# Patient Record
Sex: FEMALE | Race: WHITE | Employment: OTHER | URBAN - METROPOLITAN AREA
[De-identification: names, ages, dates, MRNs, and addresses within clinical notes are randomized per-mention and may not be internally consistent; named-entity substitution may affect disease eponyms.]

---

## 2017-01-24 ENCOUNTER — HOSPITAL ENCOUNTER (OUTPATIENT)
Dept: BONE DENSITY | Facility: CLINIC | Age: 77
Discharge: HOME | End: 2017-01-24
Attending: PEDIATRICS | Admitting: PEDIATRICS
Payer: MEDICARE

## 2017-01-24 DIAGNOSIS — Z78.0 POST-MENOPAUSAL: ICD-10-CM

## 2017-01-24 PROCEDURE — 77080 DXA BONE DENSITY AXIAL: CPT

## 2017-02-03 ENCOUNTER — HOSPITAL ENCOUNTER (OUTPATIENT)
Dept: CT IMAGING | Facility: CLINIC | Age: 77
Discharge: HOME | End: 2017-02-03
Attending: PEDIATRICS | Admitting: PEDIATRICS
Payer: MEDICARE

## 2017-02-03 DIAGNOSIS — K11.8 PAROTID MASS: ICD-10-CM

## 2017-02-03 LAB
CREAT BLD-MCNC: 1 MG/DL (ref 0.52–1.04)
GFR SERPL CREATININE-BSD FRML MDRD: 54 ML/MIN/1.7M2

## 2017-02-03 PROCEDURE — 70491 CT SOFT TISSUE NECK W/DYE: CPT

## 2017-02-03 PROCEDURE — 25500064 ZZH RX 255 OP 636: Performed by: RADIOLOGY

## 2017-02-03 PROCEDURE — 82565 ASSAY OF CREATININE: CPT

## 2017-02-03 PROCEDURE — 25000125 ZZHC RX 250: Performed by: RADIOLOGY

## 2017-02-03 RX ORDER — IOPAMIDOL 755 MG/ML
80 INJECTION, SOLUTION INTRAVASCULAR ONCE
Status: COMPLETED | OUTPATIENT
Start: 2017-02-03 | End: 2017-02-03

## 2017-02-03 RX ADMIN — IOPAMIDOL 80 ML: 755 INJECTION, SOLUTION INTRAVENOUS at 11:55

## 2017-02-03 RX ADMIN — SODIUM CHLORIDE 60 ML: 9 INJECTION, SOLUTION INTRAVENOUS at 11:55

## 2017-11-06 ENCOUNTER — HOSPITAL ENCOUNTER (OUTPATIENT)
Facility: CLINIC | Age: 77
End: 2017-11-06
Attending: OPHTHALMOLOGY | Admitting: OPHTHALMOLOGY
Payer: MEDICARE

## 2017-11-27 RX ORDER — PHENYLEPHRINE HYDROCHLORIDE 25 MG/ML
1 SOLUTION/ DROPS OPHTHALMIC
Status: CANCELLED | OUTPATIENT
Start: 2017-11-27

## 2017-11-27 RX ORDER — TROPICAMIDE 10 MG/ML
1 SOLUTION/ DROPS OPHTHALMIC
Status: CANCELLED | OUTPATIENT
Start: 2017-11-27

## 2017-11-27 RX ORDER — MOXIFLOXACIN 5 MG/ML
1 SOLUTION/ DROPS OPHTHALMIC
Status: CANCELLED | OUTPATIENT
Start: 2017-11-27

## 2017-11-27 RX ORDER — DICLOFENAC SODIUM 1 MG/ML
1 SOLUTION/ DROPS OPHTHALMIC
Status: CANCELLED | OUTPATIENT
Start: 2017-11-27

## 2017-12-05 ENCOUNTER — ANESTHESIA EVENT (OUTPATIENT)
Dept: SURGERY | Facility: CLINIC | Age: 77
End: 2017-12-05
Payer: MEDICARE

## 2017-12-05 ENCOUNTER — HOSPITAL ENCOUNTER (OUTPATIENT)
Facility: CLINIC | Age: 77
Discharge: HOME | End: 2017-12-05
Attending: OPHTHALMOLOGY | Admitting: OPHTHALMOLOGY
Payer: MEDICARE

## 2017-12-05 ENCOUNTER — ANESTHESIA (OUTPATIENT)
Dept: SURGERY | Facility: CLINIC | Age: 77
End: 2017-12-05
Payer: MEDICARE

## 2017-12-05 VITALS
DIASTOLIC BLOOD PRESSURE: 84 MMHG | OXYGEN SATURATION: 96 % | SYSTOLIC BLOOD PRESSURE: 119 MMHG | WEIGHT: 145 LBS | HEIGHT: 62 IN | RESPIRATION RATE: 16 BRPM | BODY MASS INDEX: 26.68 KG/M2 | TEMPERATURE: 97.1 F

## 2017-12-05 PROCEDURE — 93010 ELECTROCARDIOGRAM REPORT: CPT | Performed by: INTERNAL MEDICINE

## 2017-12-05 PROCEDURE — 25000125 ZZHC RX 250: Performed by: OPHTHALMOLOGY

## 2017-12-05 PROCEDURE — 25000128 H RX IP 250 OP 636: Performed by: OPHTHALMOLOGY

## 2017-12-05 PROCEDURE — V2788 PRESBYOPIA-CORRECT FUNCTION: HCPCS | Performed by: OPHTHALMOLOGY

## 2017-12-05 PROCEDURE — 40000170 ZZH STATISTIC PRE-PROCEDURE ASSESSMENT II: Performed by: OPHTHALMOLOGY

## 2017-12-05 PROCEDURE — 25000125 ZZHC RX 250: Performed by: ANESTHESIOLOGY

## 2017-12-05 PROCEDURE — 27210794 ZZH OR GENERAL SUPPLY STERILE: Performed by: OPHTHALMOLOGY

## 2017-12-05 PROCEDURE — 36000135 ZZH KERATOTOMY ARCUATE W FEMTOSECOND LASER/IMAGING FOR ATIOL: Performed by: OPHTHALMOLOGY

## 2017-12-05 PROCEDURE — V2632 POST CHMBR INTRAOCULAR LENS: HCPCS | Performed by: OPHTHALMOLOGY

## 2017-12-05 PROCEDURE — 93005 ELECTROCARDIOGRAM TRACING: CPT

## 2017-12-05 PROCEDURE — 36000101 ZZH EYE SURGERY LEVEL 3 1ST 30 MIN: Performed by: OPHTHALMOLOGY

## 2017-12-05 PROCEDURE — 71000028 ZZH EYE RECOVERY PHASE 2 EACH 15 MINS: Performed by: OPHTHALMOLOGY

## 2017-12-05 PROCEDURE — 25000128 H RX IP 250 OP 636: Performed by: NURSE ANESTHETIST, CERTIFIED REGISTERED

## 2017-12-05 PROCEDURE — 25000128 H RX IP 250 OP 636: Performed by: ANESTHESIOLOGY

## 2017-12-05 PROCEDURE — 37000008 ZZH ANESTHESIA TECHNICAL FEE, 1ST 30 MIN: Performed by: OPHTHALMOLOGY

## 2017-12-05 PROCEDURE — 40000269 ZZH STATISTIC NO CHARGE FACILITY FEE

## 2017-12-05 RX ORDER — PHENYLEPHRINE HYDROCHLORIDE 25 MG/ML
1 SOLUTION/ DROPS OPHTHALMIC
Status: COMPLETED | OUTPATIENT
Start: 2017-12-05 | End: 2017-12-05

## 2017-12-05 RX ORDER — TROPICAMIDE 10 MG/ML
1 SOLUTION/ DROPS OPHTHALMIC
Status: COMPLETED | OUTPATIENT
Start: 2017-12-05 | End: 2017-12-05

## 2017-12-05 RX ORDER — ONDANSETRON 2 MG/ML
INJECTION INTRAMUSCULAR; INTRAVENOUS PRN
Status: DISCONTINUED | OUTPATIENT
Start: 2017-12-05 | End: 2017-12-05

## 2017-12-05 RX ORDER — BALANCED SALT SOLUTION 6.4; .75; .48; .3; 3.9; 1.7 MG/ML; MG/ML; MG/ML; MG/ML; MG/ML; MG/ML
SOLUTION OPHTHALMIC PRN
Status: DISCONTINUED | OUTPATIENT
Start: 2017-12-05 | End: 2017-12-05 | Stop reason: HOSPADM

## 2017-12-05 RX ORDER — SODIUM CHLORIDE, SODIUM LACTATE, POTASSIUM CHLORIDE, CALCIUM CHLORIDE 600; 310; 30; 20 MG/100ML; MG/100ML; MG/100ML; MG/100ML
INJECTION, SOLUTION INTRAVENOUS CONTINUOUS
Status: DISCONTINUED | OUTPATIENT
Start: 2017-12-05 | End: 2017-12-05 | Stop reason: HOSPADM

## 2017-12-05 RX ORDER — MOXIFLOXACIN 5 MG/ML
1 SOLUTION/ DROPS OPHTHALMIC
Status: COMPLETED | OUTPATIENT
Start: 2017-12-05 | End: 2017-12-05

## 2017-12-05 RX ORDER — MULTIPLE VITAMINS W/ MINERALS TAB 9MG-400MCG
1 TAB ORAL DAILY
COMMUNITY

## 2017-12-05 RX ORDER — LIDOCAINE HYDROCHLORIDE 10 MG/ML
INJECTION, SOLUTION EPIDURAL; INFILTRATION; INTRACAUDAL; PERINEURAL PRN
Status: DISCONTINUED | OUTPATIENT
Start: 2017-12-05 | End: 2017-12-05 | Stop reason: HOSPADM

## 2017-12-05 RX ORDER — PROPARACAINE HYDROCHLORIDE 5 MG/ML
SOLUTION/ DROPS OPHTHALMIC PRN
Status: DISCONTINUED | OUTPATIENT
Start: 2017-12-05 | End: 2017-12-05 | Stop reason: HOSPADM

## 2017-12-05 RX ORDER — DICLOFENAC SODIUM 1 MG/ML
1 SOLUTION/ DROPS OPHTHALMIC
Status: COMPLETED | OUTPATIENT
Start: 2017-12-05 | End: 2017-12-05

## 2017-12-05 RX ORDER — LISINOPRIL AND HYDROCHLOROTHIAZIDE 12.5; 2 MG/1; MG/1
1 TABLET ORAL DAILY
COMMUNITY

## 2017-12-05 RX ADMIN — TROPICAMIDE 1 DROP: 10 SOLUTION/ DROPS OPHTHALMIC at 08:33

## 2017-12-05 RX ADMIN — ONDANSETRON 4 MG: 2 INJECTION INTRAMUSCULAR; INTRAVENOUS at 09:45

## 2017-12-05 RX ADMIN — PHENYLEPHRINE HYDROCHLORIDE 1 DROP: 2.5 SOLUTION/ DROPS OPHTHALMIC at 08:24

## 2017-12-05 RX ADMIN — MOXIFLOXACIN 1 DROP: 5 SOLUTION/ DROPS OPHTHALMIC at 08:24

## 2017-12-05 RX ADMIN — SODIUM CHLORIDE, POTASSIUM CHLORIDE, SODIUM LACTATE AND CALCIUM CHLORIDE: 600; 310; 30; 20 INJECTION, SOLUTION INTRAVENOUS at 09:45

## 2017-12-05 RX ADMIN — LIDOCAINE HYDROCHLORIDE 1 ML: 10 INJECTION, SOLUTION EPIDURAL; INFILTRATION; INTRACAUDAL; PERINEURAL at 08:29

## 2017-12-05 RX ADMIN — TROPICAMIDE 1 DROP: 10 SOLUTION/ DROPS OPHTHALMIC at 08:29

## 2017-12-05 RX ADMIN — MOXIFLOXACIN 1 DROP: 5 SOLUTION/ DROPS OPHTHALMIC at 08:29

## 2017-12-05 RX ADMIN — DICLOFENAC SODIUM 1 DROP: 1 SOLUTION OPHTHALMIC at 08:24

## 2017-12-05 RX ADMIN — DICLOFENAC SODIUM 1 DROP: 1 SOLUTION OPHTHALMIC at 08:29

## 2017-12-05 RX ADMIN — MIDAZOLAM HYDROCHLORIDE 2 MG: 1 INJECTION, SOLUTION INTRAMUSCULAR; INTRAVENOUS at 09:45

## 2017-12-05 RX ADMIN — DICLOFENAC SODIUM 1 DROP: 1 SOLUTION OPHTHALMIC at 08:33

## 2017-12-05 RX ADMIN — PHENYLEPHRINE HYDROCHLORIDE 1 DROP: 2.5 SOLUTION/ DROPS OPHTHALMIC at 08:33

## 2017-12-05 RX ADMIN — MOXIFLOXACIN 1 DROP: 5 SOLUTION/ DROPS OPHTHALMIC at 08:33

## 2017-12-05 RX ADMIN — TROPICAMIDE 1 DROP: 10 SOLUTION/ DROPS OPHTHALMIC at 08:24

## 2017-12-05 RX ADMIN — PHENYLEPHRINE HYDROCHLORIDE 1 DROP: 2.5 SOLUTION/ DROPS OPHTHALMIC at 08:29

## 2017-12-05 ASSESSMENT — LIFESTYLE VARIABLES: TOBACCO_USE: 0

## 2017-12-05 NOTE — OR NURSING
Pt c/o floaters in operative eye while lying flat post cataract surgery. Once pt able to sit up after 30 min, floaters resolved. Dr Millan here to assess pt - will follow up with pt tomorrow at post op appt since floaters have resolved.

## 2017-12-05 NOTE — ANESTHESIA PREPROCEDURE EVALUATION
Procedure: Procedure(s):  PHACOEMULSIFICATION CLEAR CORNEA WITH DELUXE INTRAOCULAR LENS IMPLANT  Preop diagnosis: RIGHT EYE CATARACT    Allergies not on file  No past medical history on file.  No past surgical history on file.  Social History   Substance Use Topics     Smoking status: Not on file     Smokeless tobacco: Not on file     Alcohol use Not on file     Prior to Admission medications    Not on File     Current Facility-Administered Medications Ordered in Epic   Medication Dose Route Frequency Last Rate Last Dose     diclofenac (VOLTAREN) 0.1 % ophthalmic solution 1 drop  1 drop Ophthalmic q5 Min Prior to Surgery         lidocaine (AKTEN) ophthalmic gel 0.5 mL  0.5 mL Ophthalmic Once         moxifloxacin (VIGAMOX) 0.5 % ophthalmic solution 1 drop  1 drop Ophthalmic q5 Min Prior to Surgery         phenylephrine (MYDFRIN /YENNI-SYNEPHRINE) 2.5 % ophthalmic solution 1 drop  1 drop Ophthalmic q5 Min Prior to Surgery         povidone-iodine 5 % ophthalmic solution 1 drop  1 drop Ophthalmic Once         tropicamide (MYDRIACYL) 1 % ophthalmic solution 1 drop  1 drop Ophthalmic q5 Min Prior to Surgery         lidocaine 1 % 1 mL  1 mL Other Q1H PRN         lactated ringers infusion   Intravenous Continuous         No current [facility]-ordered outpatient prescriptions on file.       lactated ringers       Wt Readings from Last 1 Encounters:   No data found for Wt     Temp Readings from Last 1 Encounters:   No data found for Temp     BP Readings from Last 6 Encounters:   No data found for BP     Pulse Readings from Last 4 Encounters:   No data found for Pulse     Resp Readings from Last 1 Encounters:   No data found for Resp     SpO2 Readings from Last 1 Encounters:   No data found for SpO2     Recent Labs   Lab Test  08/05/11   0657  08/04/11   1330   NA  142  139   POTASSIUM  3.6  3.8   CHLORIDE  109  103   CO2  26  27   ANIONGAP  7  9   GLC  87  99   BUN  28  49*   CR  0.96  1.32*   FRANCINE  8.6  9.5     No results for  input(s): AST, ALT, ALKPHOS, BILITOTAL, LIPASE in the last 29292 hours.  Recent Labs   Lab Test  08/05/11   0657  08/04/11   1330   WBC   --   6.2   HGB  11.1*  12.4   PLT   --   256     No results for input(s): ABO, RH in the last 58653 hours.  Recent Labs   Lab Test  08/04/11   1330   INR  1.06      Recent Labs   Lab Test  08/04/11   1330   TROPI  <0.012         Anesthesia Evaluation     . Pt has had prior anesthetic.     No history of anesthetic complications          ROS/MED HX    ENT/Pulmonary:      (-) tobacco use and asthma   Neurologic:     (+)TIA     Cardiovascular:     (+) hypertension----. : . . . :. .       METS/Exercise Tolerance:  >4 METS   Hematologic:         Musculoskeletal:         GI/Hepatic:        (-) GERD and liver disease   Renal/Genitourinary:      (-) renal disease   Endo:         Psychiatric:         Infectious Disease:         Malignancy:         Other:                     Physical Exam      Airway   Mallampati: II  TM distance: >3 FB  Neck ROM: full    Dental   (+) caps    Cardiovascular       Pulmonary                     Anesthesia Plan      History & Physical Review      ASA Status:  2 .    NPO Status:  > 8 hours    Plan for MAC          Postoperative Care      Consents  Anesthetic plan, risks, benefits and alternatives discussed with:  Patient..                          .

## 2017-12-05 NOTE — ANESTHESIA POSTPROCEDURE EVALUATION
Patient: Princess Corrales    Procedure(s):  RIGHT EYE FEMTOSECOND LASER ASSISTED PHACOEMULSIFICATION CLEAR CORNEA WITH DELUXE MULTIFOCAL INTRAOCULAR LENS IMPLANT - Wound Class: I-Clean    Diagnosis:RIGHT EYE CATARACT  Diagnosis Additional Information: No value filed.    Anesthesia Type:  MAC    Note:  Anesthesia Post Evaluation    Patient location during evaluation: PACU  Patient participation: Able to fully participate in evaluation  Level of consciousness: awake and awake and alert  Pain management: adequate  Airway patency: patent  Cardiovascular status: acceptable  Respiratory status: acceptable  Hydration status: acceptable  PONV: none     Anesthetic complications: None          Last vitals:  Vitals:    12/05/17 1010 12/05/17 1024 12/05/17 1045   BP: 124/64 129/70 119/84   Resp: 16 16 16   Temp:      SpO2: 93% 96% 96%         Electronically Signed By: Anh Biggs  December 5, 2017  11:52 AM

## 2017-12-05 NOTE — ANESTHESIA CARE TRANSFER NOTE
Patient: Princess Corrales    Procedure(s):  RIGHT EYE FEMTOSECOND LASER ASSISTED PHACOEMULSIFICATION CLEAR CORNEA WITH DELUXE MULTIFOCAL INTRAOCULAR LENS IMPLANT - Wound Class: I-Clean    Diagnosis: RIGHT EYE CATARACT  Diagnosis Additional Information: No value filed.    Anesthesia Type:   MAC     Note:  Airway :Room Air  Patient transferred to:PACU  Comments: [facility]red to [facility] recovery room in recliner with armrests up, spontaneous respirations, O2 saturation maintained greater than 95% with oxygen via room air. All monitors and alarms on and functioning, clinically stable vital signs. Report given to recovery RN and questions answered. Patient alert and following verbal directions.Handoff Report: Identifed the Patient, Identified the Reponsible Provider, Reviewed the pertinent medical history, Discussed the surgical course, Reviewed Intra-OP anesthesia mangement and issues during anesthesia, Set expectations for post-procedure period and Allowed opportunity for questions and acknowledgement of understanding      Vitals: (Last set prior to Anesthesia Care Transfer)    CRNA VITALS  12/5/2017 0936 - 12/5/2017 1008      12/5/2017             Pulse: 51    Ht Rate: 54    SpO2: 99 %    Resp Rate (set): 10                Electronically Signed By: ROCHELLE Moreno CRNA  December 5, 2017  10:08 AM

## 2017-12-05 NOTE — ADDENDUM NOTE
Addendum  created 12/05/17 1202 by Mireya Dhaliwal APRN CRNA    Anesthesia Intra SmartForms edited

## 2017-12-05 NOTE — OP NOTE
[facility]  Ophthalmology Operative Note    PREOPERATIVE DIAGNOSIS: Cataract, Right eye.   POSTOPERATIVE DIAGNOSIS: Cataract, Right eye.   OPERATION: Cataract extraction with placement of posterior chamber intraocular lens in the Right eye.   ANESTHESIA: Topical   INDICATIONS FOR PROCEDURE: Princess Corrales was seen in the [facility] for decreased visual acuity in the Right eye. The patient was found to have a cataract in the Right eye. The risks, benefits, alternatives and goals of cataract extraction were discussed with the patient, and after adequate discussion the patient understood and agreed to these, and a signed informed consent was obtained prior to the procedure.   DESCRIPTION OF PROCEDURE: After proper patient identification, topical anesthesia was applied to the Right eye. The patient was brought to the operating room and the Right eye was prepped and draped in the usual manner for intraocular surgery. A lid speculum was placed in the Right eye. A paracentesis was then created and the anterior chamber was filled with 1% non-preserved lidocaine followed by Viscoat. A clear corneal incision was then created, tunneling 2 mm into the cornea before entering the anterior chamber. A continuous curvilinear capsulorrhexis was then created using a cystotome and Utrata forceps. Hydrodissection was carried out with BSS on a blunt-tip cannula and the lens rotated freely within the capsular bag. Phacoemulsification was then carried out using the stop and chop technique. Residual cortical material was removed using the I&A handpiece. The capsular bag was then filled with Provisc and an injectable lens was injected into the capsular bag. The lens showed good centration and stability. Residual viscoelastic was removed using the I&A handpiece. The wound was then hydrated and the anterior chamber reformed through the paracentesis. The wound was checked and found to be  sealed. Topical drops of Vigamox and a Econopred were placed in the patient's Right eye followed by a [place] shield over the top of this. The patient tolerated the procedure well without complications and was told to follow up in the clinic in the next postoperative day.       Implant Name Type Inv. Item Serial No.  Lot No. LRB No. Used   DANIELLA TECHNIS SYMFONY [id number], 18.5 DIOPTER     [id number]     Right 1        FLOR HART MD

## 2017-12-05 NOTE — IP AVS SNAPSHOT
MRN:[id number]                      After Visit Summary   12/5/2017    Princess Corrales    MRN: [id number]           Thank you!     Thank you for choosing [facility] for your care. Our goal is always to provide you with excellent care. Hearing back from our patients is one way we can continue to improve our services. Please take a few minutes to complete the written survey that you may receive in the mail after you visit with us. Thank you!        Patient Information     Date Of Birth          1940        About your hospital stay     You were admitted on:  December 5, 2017 You last received care in the:  [facility] [place]    You were discharged on:  December 5, 2017       Who to Call     For medical emergencies, please call 911.  For non-urgent questions about your medical care, please call your primary care provider or clinic, [phone]  For questions related to your surgery, please call your surgery clinic        Attending Provider     Provider Specialty    Artemio Millan MD Ophthalmology       Primary Care Provider Office Phone # Fax #    Delmy FADY Kern [phone]      Your next 10 appointments already scheduled     Jan 02, 2018   Procedure with Artemio Millan MD   [facility] (--)    [address]  [facility] [id number] [phone]            Jan 02, 2018   Procedure with Artemio Millan MD   [facility] (--)    [address]  [place] [address] [phone]              Further instructions from your care team       [facility] Anest[facility] Discharge  Instructions  Anesthesia ([facility] [place])   Adult Discharge Instructions    For 24 hours after surgery    1. Get plenty of rest.  Make arrangements to have a responsible adult stay with you for at least 6 hours after you leave the hospital.  2. Do not drive or use heavy equipment for 24 hours.  "   3. Do not drink alcohol for 24 hours.  4. Do not sign legal documents or make important decisions for 24 hours.  5. Avoid strenuous or risky activities. You may feel lightheaded.  If so, sit for a few minutes before standing.  Have someone help you get up.   6. Conscious sedation patients may resume a regular diet..  7. Any questions of medical nature, call your physician.      [facility]  Cataract Surgery Discharge Instructions  [facility] MD SUSHIL Pina MD J. Hasan, MD C. Nichols, MD J. O'Neill, MD S. Schaefer, MD J. Stephens, MD        Start using drops when you arrive at home today      You may have been prescribed SmartDrops or OneDrop, which is a compound formula drop that combines all three medications in a single drop. This drop should be instilled to the surgical eye 3 times daily until gone.        Place shield over surgical eye at bedtime for 3 nights.      The eye will feel itchy, scratchy, and vision will be blurred, you may take Tylenol for the scratchy feeling if this is bothersome.      No eye rubbing or swimming for I week.      You may resume all prescription medications as directed by your primary doctor.      Call if increasing pain, progressively worsening vision or worsening redness of surgical eye.      On-call doctor can be reached at [phone].    Pending Results     No orders found from 12/3/2017 to 12/6/2017.            Admission Information     Date & Time Provider Department Dept. Phone    12/5/2017 Artemio Millan MD [facility] [place] [phone]      Your Vitals Were     Blood Pressure Temperature Respirations Height Weight Pulse Oximetry    124/64 97.1  F (36.2  C) (Temporal) 16 1.575 m (5' 2\") 65.8 kg (145 lb) 93%    BMI (Body Mass Index)                   26.52 kg/m2           MyChart Information     [facility] lets you send messages to your doctor, view your test " "results, renew your prescriptions, schedule appointments and more. To sign up, go to [email or web address] . Click on \"Log in\" on the left side of the screen, which will take you to the Welcome page. Then click on \"Sign up Now\" on the right side of the page.     You will be asked to enter the access code listed below, as well as some personal information. Please follow the directions to create your username and password.     Your access code is: [email or web address]  Expires: 3/5/2018 10:15 AM     Your access code will  in 90 days. If you need help or a new code, please call your [facility] clinic or [phone].        Care EveryWhere ID     This is your Care EveryWhere ID. This could be used by other organizations to access your [facility] medical records  [id number]        Equal Access to Services     SAHARA WILEY : Mayda Austin, fredrick gallego, mickie grissom, adriano louise . So [facility] [phone].    ATENCIÓN: Si habla español, tiene a seo disposición servicios gratuitos de asistencia lingüística. Llame al [phone].    We comply with applicable federal civil rights laws and [place] laws. We do not discriminate on the basis of race, color, national origin, age, disability, sex, sexual orientation, or gender identity.               Review of your medicines      UNREVIEWED medicines. Ask your doctor about these medicines        Dose / Directions    ACETAMINOPHEN PO        Dose:  325 mg   Take 325 mg by mouth every 4 hours as needed for pain   Refills:  0       ATORVASTATIN CALCIUM PO        Dose:  80 mg   Take 80 mg by mouth daily   Refills:  0       Calcium Carb-Cholecalciferol 600-200 MG-UNIT Tabs        Refills:  0       CITALOPRAM HYDROBROMIDE PO        Dose:  10 mg   Take 10 mg by mouth daily   Refills:  0       CLOPIDOGREL BISULFATE PO        Dose:  75 mg   Take 75 mg by mouth daily   Refills:  0       DONEPEZIL HCL PO        Dose:  5 mg   Take 5 mg " by mouth At Bedtime   Refills:  0       lisinopril-hydrochlorothiazide 20-12.5 MG per tablet   Commonly known as:  PRINZIDE/ZESTORETIC        Dose:  1 tablet   Take 1 tablet by mouth daily   Refills:  0       Multi-vitamin Tabs tablet        Dose:  1 tablet   Take 1 tablet by mouth daily   Refills:  0                Protect others around you: Learn how to safely use, store and throw away your medicines at www.disposemymeds.org.             Medication List: This is a list of all your medications and when to take them. Check marks below indicate your daily home schedule. Keep this list as a reference.      Medications           Morning Afternoon Evening Bedtime As Needed    ACETAMINOPHEN PO   Take 325 mg by mouth every 4 hours as needed for pain                                ATORVASTATIN CALCIUM PO   Take 80 mg by mouth daily                                Calcium Carb-Cholecalciferol 600-200 MG-UNIT Tabs                                CITALOPRAM HYDROBROMIDE PO   Take 10 mg by mouth daily                                CLOPIDOGREL BISULFATE PO   Take 75 mg by mouth daily                                DONEPEZIL HCL PO   Take 5 mg by mouth At Bedtime                                lisinopril-hydrochlorothiazide 20-12.5 MG per tablet   Commonly known as:  PRINZIDE/ZESTORETIC   Take 1 tablet by mouth daily                                Multi-vitamin Tabs tablet   Take 1 tablet by mouth daily

## 2017-12-05 NOTE — IP AVS SNAPSHOT
[facility]    [address]    [place] [address]    Phone:  [phone]    Fax:  [phone]                                       After Visit Summary   12/5/2017    Princess Corrales    MRN: [id number]           After Visit Summary Signature Page     I have received my discharge instructions, and my questions have been answered. I have discussed any challenges I see with this plan with the nurse or doctor.    ..........................................................................................................................................  Patient/Patient Representative Signature      ..........................................................................................................................................  Patient Representative Print Name and Relationship to Patient    ..................................................               ................................................  Date                                            Time    ..........................................................................................................................................  Reviewed by Signature/Title    ...................................................              ..............................................  Date                                                            Time

## 2017-12-05 NOTE — OR NURSING
Pt's HR 32-60 in phase 2. 's/80's. Pt asymptomatic with low HR. Dr Biggs aware - obtained EKG - pt in [facility]. Dr Biggs explained EKG, symptoms to watch for and instructed her on when to call MD.     Pt and daughter verbalized understanding of post op cataract instructions and EKG/heart rhythm. Smart drop returned to pt. Pt remains asymptomatic upon discharge. Denies pain, DC'd to home.

## 2017-12-05 NOTE — DISCHARGE INSTRUCTIONS
[facility] Anesthesia [facility] Discharge  Instructions  Anesthesia ([facility])   Adult Discharge Instructions    For 24 hours after surgery    1. Get plenty of rest.  Make arrangements to have a responsible adult stay with you for at least 6 hours after you leave the hospital.  2. Do not drive or use heavy equipment for 24 hours.    3. Do not drink alcohol for 24 hours.  4. Do not sign legal documents or make important decisions for 24 hours.  5. Avoid strenuous or risky activities. You may feel lightheaded.  If so, sit for a few minutes before standing.  Have someone help you get up.   6. Conscious sedation patients may resume a regular diet..  7. Any questions of medical nature, call your physician.      [facility]  Cataract Surgery Discharge Instructions  [facility] MD SUSHIL Pina MD J. Hasan, MD C. Nichols, MD J. O'Neill, MD S. Schaefer, MD J. Stephens, MD        Start using drops when you arrive at home today      You may have been prescribed SmartDrops or OneDrop, which is a compound formula drop that combines all three medications in a single drop. This drop should be instilled to the surgical eye 3 times daily until gone.        Place shield over surgical eye at bedtime for 3 nights.      The eye will feel itchy, scratchy, and vision will be blurred, you may take Tylenol for the scratchy feeling if this is bothersome.      No eye rubbing or swimming for I week.      You may resume all prescription medications as directed by your primary doctor.      Call if increasing pain, progressively worsening vision or worsening redness of surgical eye.      On-call doctor can be reached at [phone].

## 2017-12-07 LAB — INTERPRETATION ECG - MUSE: NORMAL

## 2017-12-27 RX ORDER — TROPICAMIDE 10 MG/ML
1 SOLUTION/ DROPS OPHTHALMIC
Status: CANCELLED | OUTPATIENT
Start: 2017-12-27

## 2017-12-27 RX ORDER — DICLOFENAC SODIUM 1 MG/ML
1 SOLUTION/ DROPS OPHTHALMIC
Status: CANCELLED | OUTPATIENT
Start: 2017-12-27

## 2017-12-27 RX ORDER — MOXIFLOXACIN 5 MG/ML
1 SOLUTION/ DROPS OPHTHALMIC
Status: CANCELLED | OUTPATIENT
Start: 2017-12-27

## 2017-12-27 RX ORDER — PHENYLEPHRINE HYDROCHLORIDE 25 MG/ML
1 SOLUTION/ DROPS OPHTHALMIC
Status: CANCELLED | OUTPATIENT
Start: 2017-12-27

## 2017-12-28 NOTE — OP NOTE
[facility]  Ophthalmology Operative Note    PREOPERATIVE DIAGNOSIS: Cataract, Right eye.   POSTOPERATIVE DIAGNOSIS: Cataract, Right eye.   OPERATION: Cataract extraction with placement of posterior chamber intraocular lens in the Right eye.   ANESTHESIA: Topical   INDICATIONS FOR PROCEDURE: Princess Corrales was seen in the [facility] for decreased visual acuity in the Right eye. The patient was found to have a cataract in the Right eye. The risks, benefits, alternatives and goals of cataract extraction were discussed with the patient, and after adequate discussion the patient understood and agreed to these, and a signed informed consent was obtained prior to the procedure.   DESCRIPTION OF PROCEDURE: After proper patient identification, topical anesthesia was applied to the Right eye. The patient was brought to the operating room and the Right eye was prepped and draped in the usual manner for intraocular surgery. A lid speculum was placed in the Right eye. A paracentesis was then created and the anterior chamber was filled with 1% non-preserved lidocaine followed by Viscoat. A clear corneal incision was then created, tunneling 2 mm into the cornea before entering the anterior chamber. A continuous curvilinear capsulorrhexis was then created using a cystotome and Utrata forceps. Hydrodissection was carried out with BSS on a blunt-tip cannula and the lens rotated freely within the capsular bag. Phacoemulsification was then carried out using the stop and chop technique. Residual cortical material was removed using the I&A handpiece. The capsular bag was then filled with Provisc and an injectable lens was injected into the capsular bag. The lens showed good centration and stability. Residual viscoelastic was removed using the I&A handpiece. The wound was then hydrated and the anterior chamber reformed through the paracentesis. The wound was checked and found to be  sealed. Topical drops of Vigamox and a Econopred were placed in the patient's Right eye followed by a [place] shield over the top of this. The patient tolerated the procedure well without complications and was told to follow up in the clinic in the next postoperative day.       Implant Name Type Inv. Item Serial No.  Lot No. LRB No. Used   EYE IMP IOL DANIELLA 1-PIECE TECNIS SYMPHONY MULTIFOCAL TORIC +18.5 [id number]     [id number]     Right 1        FLOR HART MD

## 2018-01-02 ENCOUNTER — SURGERY (OUTPATIENT)
Age: 78
End: 2018-01-02

## 2018-01-02 ENCOUNTER — ANESTHESIA EVENT (OUTPATIENT)
Dept: SURGERY | Facility: CLINIC | Age: 78
End: 2018-01-02
Payer: MEDICARE

## 2018-01-02 ENCOUNTER — HOSPITAL ENCOUNTER (OUTPATIENT)
Facility: CLINIC | Age: 78
Discharge: HOME | End: 2018-01-02
Attending: OPHTHALMOLOGY | Admitting: OPHTHALMOLOGY
Payer: MEDICARE

## 2018-01-02 ENCOUNTER — ANESTHESIA (OUTPATIENT)
Dept: SURGERY | Facility: CLINIC | Age: 78
End: 2018-01-02
Payer: MEDICARE

## 2018-01-02 VITALS
SYSTOLIC BLOOD PRESSURE: 121 MMHG | DIASTOLIC BLOOD PRESSURE: 66 MMHG | HEIGHT: 63 IN | RESPIRATION RATE: 12 BRPM | WEIGHT: 147.05 LBS | TEMPERATURE: 97.5 F | BODY MASS INDEX: 26.05 KG/M2 | OXYGEN SATURATION: 94 %

## 2018-01-02 PROCEDURE — V2788 PRESBYOPIA-CORRECT FUNCTION: HCPCS | Performed by: OPHTHALMOLOGY

## 2018-01-02 PROCEDURE — 27210794 ZZH OR GENERAL SUPPLY STERILE: Performed by: OPHTHALMOLOGY

## 2018-01-02 PROCEDURE — 37000008 ZZH ANESTHESIA TECHNICAL FEE, 1ST 30 MIN: Performed by: OPHTHALMOLOGY

## 2018-01-02 PROCEDURE — 40000170 ZZH STATISTIC PRE-PROCEDURE ASSESSMENT II: Performed by: OPHTHALMOLOGY

## 2018-01-02 PROCEDURE — 25000128 H RX IP 250 OP 636: Performed by: ANESTHESIOLOGY

## 2018-01-02 PROCEDURE — 37000009 ZZH ANESTHESIA TECHNICAL FEE, EACH ADDTL 15 MIN: Performed by: OPHTHALMOLOGY

## 2018-01-02 PROCEDURE — V2632 POST CHMBR INTRAOCULAR LENS: HCPCS | Performed by: OPHTHALMOLOGY

## 2018-01-02 PROCEDURE — 25000125 ZZHC RX 250: Performed by: OPHTHALMOLOGY

## 2018-01-02 PROCEDURE — 25000128 H RX IP 250 OP 636: Performed by: NURSE ANESTHETIST, CERTIFIED REGISTERED

## 2018-01-02 PROCEDURE — 25000125 ZZHC RX 250: Performed by: ANESTHESIOLOGY

## 2018-01-02 PROCEDURE — 25000125 ZZHC RX 250: Performed by: NURSE ANESTHETIST, CERTIFIED REGISTERED

## 2018-01-02 PROCEDURE — 71000028 ZZH EYE RECOVERY PHASE 2 EACH 15 MINS: Performed by: OPHTHALMOLOGY

## 2018-01-02 PROCEDURE — 25000128 H RX IP 250 OP 636: Performed by: OPHTHALMOLOGY

## 2018-01-02 PROCEDURE — 36000105 ZZH EYE SURGERY LEVEL 4 EA 15 ADDTL MIN: Performed by: OPHTHALMOLOGY

## 2018-01-02 PROCEDURE — 36000104 ZZH EYE SURGERY LEVEL 4 1ST 30 MIN: Performed by: OPHTHALMOLOGY

## 2018-01-02 DEVICE — IMPLANTABLE DEVICE: Type: IMPLANTABLE DEVICE | Site: EYE | Status: FUNCTIONAL

## 2018-01-02 RX ORDER — LIDOCAINE HYDROCHLORIDE 10 MG/ML
INJECTION, SOLUTION EPIDURAL; INFILTRATION; INTRACAUDAL; PERINEURAL PRN
Status: DISCONTINUED | OUTPATIENT
Start: 2018-01-02 | End: 2018-01-02 | Stop reason: HOSPADM

## 2018-01-02 RX ORDER — SODIUM CHLORIDE, SODIUM LACTATE, POTASSIUM CHLORIDE, CALCIUM CHLORIDE 600; 310; 30; 20 MG/100ML; MG/100ML; MG/100ML; MG/100ML
INJECTION, SOLUTION INTRAVENOUS CONTINUOUS
Status: DISCONTINUED | OUTPATIENT
Start: 2018-01-02 | End: 2018-01-02 | Stop reason: HOSPADM

## 2018-01-02 RX ORDER — BALANCED SALT SOLUTION 6.4; .75; .48; .3; 3.9; 1.7 MG/ML; MG/ML; MG/ML; MG/ML; MG/ML; MG/ML
SOLUTION OPHTHALMIC PRN
Status: DISCONTINUED | OUTPATIENT
Start: 2018-01-02 | End: 2018-01-02 | Stop reason: HOSPADM

## 2018-01-02 RX ORDER — BRIMONIDINE TARTRATE 2 MG/ML
SOLUTION/ DROPS OPHTHALMIC PRN
Status: DISCONTINUED | OUTPATIENT
Start: 2018-01-02 | End: 2018-01-02 | Stop reason: HOSPADM

## 2018-01-02 RX ORDER — TIMOLOL MALEATE 5 MG/ML
SOLUTION/ DROPS OPHTHALMIC PRN
Status: DISCONTINUED | OUTPATIENT
Start: 2018-01-02 | End: 2018-01-02 | Stop reason: HOSPADM

## 2018-01-02 RX ORDER — ONDANSETRON 2 MG/ML
INJECTION INTRAMUSCULAR; INTRAVENOUS PRN
Status: DISCONTINUED | OUTPATIENT
Start: 2018-01-02 | End: 2018-01-02

## 2018-01-02 RX ORDER — OFLOXACIN 3 MG/ML
SOLUTION/ DROPS OPHTHALMIC PRN
Status: DISCONTINUED | OUTPATIENT
Start: 2018-01-02 | End: 2018-01-02 | Stop reason: HOSPADM

## 2018-01-02 RX ORDER — TROPICAMIDE 10 MG/ML
1 SOLUTION/ DROPS OPHTHALMIC
Status: COMPLETED | OUTPATIENT
Start: 2018-01-02 | End: 2018-01-02

## 2018-01-02 RX ORDER — PHENYLEPHRINE HYDROCHLORIDE 25 MG/ML
1 SOLUTION/ DROPS OPHTHALMIC
Status: COMPLETED | OUTPATIENT
Start: 2018-01-02 | End: 2018-01-02

## 2018-01-02 RX ORDER — DICLOFENAC SODIUM 1 MG/ML
1 SOLUTION/ DROPS OPHTHALMIC
Status: COMPLETED | OUTPATIENT
Start: 2018-01-02 | End: 2018-01-02

## 2018-01-02 RX ORDER — MOXIFLOXACIN 5 MG/ML
1 SOLUTION/ DROPS OPHTHALMIC
Status: COMPLETED | OUTPATIENT
Start: 2018-01-02 | End: 2018-01-02

## 2018-01-02 RX ORDER — PREDNISOLONE ACETATE 10 MG/ML
SUSPENSION/ DROPS OPHTHALMIC PRN
Status: DISCONTINUED | OUTPATIENT
Start: 2018-01-02 | End: 2018-01-02 | Stop reason: HOSPADM

## 2018-01-02 RX ADMIN — ONDANSETRON 4 MG: 2 INJECTION INTRAMUSCULAR; INTRAVENOUS at 08:23

## 2018-01-02 RX ADMIN — EPINEPHRINE 500 ML: 1 INJECTION, SOLUTION, CONCENTRATE INTRAVENOUS at 08:27

## 2018-01-02 RX ADMIN — PHENYLEPHRINE HYDROCHLORIDE 1 DROP: 2.5 SOLUTION/ DROPS OPHTHALMIC at 07:31

## 2018-01-02 RX ADMIN — OFLOXACIN 1 DROP: 3 SOLUTION/ DROPS OPHTHALMIC at 09:06

## 2018-01-02 RX ADMIN — PREDNISOLONE ACETATE 1 DROP: 10 SUSPENSION/ DROPS OPHTHALMIC at 09:06

## 2018-01-02 RX ADMIN — TIMOLOL MALEATE 1 DROP: 5 SOLUTION OPHTHALMIC at 09:06

## 2018-01-02 RX ADMIN — PHENYLEPHRINE HYDROCHLORIDE 1 DROP: 2.5 SOLUTION/ DROPS OPHTHALMIC at 07:27

## 2018-01-02 RX ADMIN — MOXIFLOXACIN 1 DROP: 5 SOLUTION/ DROPS OPHTHALMIC at 07:27

## 2018-01-02 RX ADMIN — DEXMEDETOMIDINE HYDROCHLORIDE 8 MCG: 100 INJECTION, SOLUTION INTRAVENOUS at 08:52

## 2018-01-02 RX ADMIN — MOXIFLOXACIN 1 DROP: 5 SOLUTION/ DROPS OPHTHALMIC at 07:37

## 2018-01-02 RX ADMIN — MIDAZOLAM 1 MG: 1 INJECTION INTRAMUSCULAR; INTRAVENOUS at 08:19

## 2018-01-02 RX ADMIN — SODIUM CHLORIDE, POTASSIUM CHLORIDE, SODIUM LACTATE AND CALCIUM CHLORIDE: 600; 310; 30; 20 INJECTION, SOLUTION INTRAVENOUS at 07:38

## 2018-01-02 RX ADMIN — LIDOCAINE HYDROCHLORIDE: 10 INJECTION, SOLUTION EPIDURAL; INFILTRATION; INTRACAUDAL; PERINEURAL at 07:37

## 2018-01-02 RX ADMIN — ACETYLCHOLINE CHLORIDE 5 MG: KIT at 08:50

## 2018-01-02 RX ADMIN — MOXIFLOXACIN 1 DROP: 5 SOLUTION/ DROPS OPHTHALMIC at 07:31

## 2018-01-02 RX ADMIN — LIDOCAINE HYDROCHLORIDE 0.5 ML: 35 GEL OPHTHALMIC at 08:27

## 2018-01-02 RX ADMIN — TROPICAMIDE 1 DROP: 10 SOLUTION/ DROPS OPHTHALMIC at 07:31

## 2018-01-02 RX ADMIN — DICLOFENAC SODIUM 1 DROP: 1 SOLUTION/ DROPS OPHTHALMIC at 07:27

## 2018-01-02 RX ADMIN — LIDOCAINE HYDROCHLORIDE 1 ML: 10 INJECTION, SOLUTION EPIDURAL; INFILTRATION; INTRACAUDAL; PERINEURAL at 08:27

## 2018-01-02 RX ADMIN — BALANCED SALT SOLUTION 15 ML: 6.4; .75; .48; .3; 3.9; 1.7 SOLUTION OPHTHALMIC at 08:27

## 2018-01-02 RX ADMIN — BRIMONIDINE TARTRATE 1 DROP: 2 SOLUTION/ DROPS OPHTHALMIC at 09:06

## 2018-01-02 RX ADMIN — TROPICAMIDE 1 DROP: 10 SOLUTION/ DROPS OPHTHALMIC at 07:37

## 2018-01-02 RX ADMIN — DICLOFENAC SODIUM 1 DROP: 1 SOLUTION/ DROPS OPHTHALMIC at 07:31

## 2018-01-02 RX ADMIN — DICLOFENAC SODIUM 1 DROP: 1 SOLUTION/ DROPS OPHTHALMIC at 07:37

## 2018-01-02 RX ADMIN — SODIUM CHONDROITIN SULFATE / SODIUM HYALURONATE 1 ML: 0.55-0.5 INJECTION INTRAOCULAR at 08:28

## 2018-01-02 RX ADMIN — PHENYLEPHRINE HYDROCHLORIDE 1 DROP: 2.5 SOLUTION/ DROPS OPHTHALMIC at 07:37

## 2018-01-02 RX ADMIN — TROPICAMIDE 1 DROP: 10 SOLUTION/ DROPS OPHTHALMIC at 07:27

## 2018-01-02 ASSESSMENT — LIFESTYLE VARIABLES: TOBACCO_USE: 0

## 2018-01-02 NOTE — OP NOTE
[facility]  Ophthalmology Operative Note    PREOPERATIVE DIAGNOSIS: Cataract, Left  Left eye.   POSTOPERATIVE DIAGNOSIS: Cataract, Left  Left eye.   OPERATION: Cataract extraction with placement of posterior chamber intraocular lens in the Left  Left eye, anterior vitrectomy left eye.   ANESTHESIA: Topical   INDICATIONS FOR PROCEDURE: Princess Corrales was seen in the [facility] for decreased visual acuity in the Left  Left eye. The patient was found to have a cataract in the Left  Left eye. The risks, benefits, alternatives and goals of cataract extraction were discussed with the patient, and after adequate discussion the patient understood and agreed to these, and a signed informed consent was obtained prior to the procedure.   DESCRIPTION OF PROCEDURE: After proper patient identification, topical anesthesia was applied to the Left  Left eye. The patient was brought to the operating room and the Left  Left eye was prepped and draped in the usual manner for intraocular surgery. A lid speculum was placed in the Left  Left eye. A paracentesis was then created and the anterior chamber was filled with 1% non-preserved lidocaine followed by Viscoat. A clear corneal incision was then created, tunneling 2 mm into the cornea before entering the anterior chamber. A continuous curvilinear capsulorrhexis was then created using a cystotome and Utrata forceps. Hydrodissection was carried out with BSS on a blunt-tip cannula and the lens rotated freely within the capsular bag. Phacoemulsification was then carried out using the stop and chop technique. Residual cortical material was removed using the I&A handpiece. A rent was noted in posterior capsule, at this point a anterior vitrectomy was carried out.  The sulcus was then filled with Provisc and an injectable lens was injected into the sulcus. The lens showed good centration and stability. Residual viscoelastic was removed  using the I&A handpiece. The wound was then hydrated and the anterior chamber reformed through the paracentesis. The wound was checked and found to be sealed. Topical drops of Vigamox and a Econopred, Alphagan, and Timolol were placed in the patient's Left  Left eye followed by a Ogden shield over the top of this. The patient tolerated the procedure well without complications and was told to follow up in the clinic in the next postoperative day.       Implant Name Type Inv. Item Serial No.  Lot No. LRB No. Used   EYE IMP IOL DANIELLA PCL DELUXE TECNIS [id number] 19.5 Lens/Eye Implant EYE IMP IOL DANIELLA PCL DELUXE TECNIS ZMA00 19.5 [id number] [employer]   Left 1        FLOR HART MD

## 2018-01-02 NOTE — ANESTHESIA POSTPROCEDURE EVALUATION
Patient: Princess Corrales    Procedure(s):  LEFT EYE PHACOEMULSIFICATION CLEAR CORNEA WITH MULTIFOCAL SULCUS INTRAOCULAR LENS IMPLANT, ANTERIOR VITRECTOMY  - Wound Class: I-Clean   - Wound Class: I-Clean    Diagnosis:LEFT EYE CATARACT  Diagnosis Additional Information: No value filed.    Anesthesia Type:  No value filed.    Note:  Anesthesia Post Evaluation    Patient location during evaluation: Phase 2  Patient participation: Able to fully participate in evaluation  Level of consciousness: awake and alert  Pain management: adequate  Airway patency: patent  Cardiovascular status: acceptable  Respiratory status: acceptable  Hydration status: acceptable  PONV: none     Anesthetic complications: None          Last vitals:  Vitals:    01/02/18 0905 01/02/18 0913 01/02/18 0919   BP: 94/51 105/54 121/66   Resp: 12 14 12   Temp:      SpO2: 94% 94% 94%         Electronically Signed By: Sharmaine Santamaria MD  January 2, 2018  1:35 PM

## 2018-01-02 NOTE — ANESTHESIA PREPROCEDURE EVALUATION
"Procedure: Procedure(s):  PHACOEMULSIFICATION CLEAR CORNEA WITH DELUXE INTRAOCULAR LENS IMPLANT  Preop diagnosis: LEFT EYE CATARACT  Allergies   Allergen Reactions     Sulfa Drugs      Patient Active Problem List   Diagnosis   (none) - all problems resolved or deleted     Past Medical History:   Diagnosis Date     Hypertension      Parotid mass      TIA (transient ischemic attack)      Past Surgical History:   Procedure Laterality Date     APPENDECTOMY       BIOPSY       COLONOSCOPY       GYN SURGERY       KERATOTOMY ARCUATE WITH FEMTOSECOND LASER/IMAGING FOR ATIOL Right 12/5/2017    Procedure: KERATOTOMY ARCUATE WITH FEMTOSECOND LASER/IMAGING FOR ATIOL;  RIGHT EYE FEMTOSECOND LASER CAPSULOTOMY, LENS FRAGMENTATION AND ARCUATE INCISIONS;  Surgeon: Artemio Millan MD;  Location: [facility]     ORTHOPEDIC SURGERY       PHACOEMULSIFICATION CLEAR CORNEA WITH DELUXE INTRAOCULAR LENS IMPLANT Right 12/5/2017    Procedure: PHACOEMULSIFICATION CLEAR CORNEA WITH DELUXE INTRAOCULAR LENS IMPLANT;  RIGHT EYE FEMTOSECOND LASER ASSISTED PHACOEMULSIFICATION CLEAR CORNEA WITH DELUXE SYMPHONY INTRAOCULAR LENS IMPLANT;  Surgeon: Artemio Millan MD;  Location: [facility]       No current facility-administered medications on file prior to encounter.   Current Outpatient Prescriptions on File Prior to Encounter:  ACETAMINOPHEN PO Take 325 mg by mouth every 4 hours as needed for pain   ATORVASTATIN CALCIUM PO Take 80 mg by mouth daily   CITALOPRAM HYDROBROMIDE PO Take 10 mg by mouth daily   DONEPEZIL HCL PO Take 5 mg by mouth At Bedtime   lisinopril-hydrochlorothiazide (PRINZIDE/ZESTORETIC) 20-12.5 MG per tablet Take 1 tablet by mouth daily   multivitamin, therapeutic with minerals (MULTI-VITAMIN) TABS tablet Take 1 tablet by mouth daily   CLOPIDOGREL BISULFATE PO Take 75 mg by mouth daily   Calcium Carb-Cholecalciferol 600-200 MG-UNIT TABS      Ht 1.591 m (5' 2.63\")  Wt 66.7 kg (147 lb 0.8 oz)  BMI 26.36 kg/m2    Lab Results   Component " Value Date    WBC 6.2 08/04/2011     Lab Results   Component Value Date    RBC 4.12 08/04/2011     Lab Results   Component Value Date    HGB 11.1 08/05/2011     Lab Results   Component Value Date    HCT 36.8 08/04/2011     Lab Results   Component Value Date    MCV 89 08/04/2011     Lab Results   Component Value Date    MCH 30.1 08/04/2011     Lab Results   Component Value Date    MCHC 33.7 08/04/2011     Lab Results   Component Value Date    RDW 14.6 08/04/2011     Lab Results   Component Value Date     08/04/2011     Lab Results   Component Value Date    INR 1.06 08/04/2011       Last Basic Metabolic Panel:  Lab Results   Component Value Date     08/05/2011      Lab Results   Component Value Date    POTASSIUM 3.6 08/05/2011     Lab Results   Component Value Date    CHLORIDE 109 08/05/2011     Lab Results   Component Value Date    FRANCINE 8.6 08/05/2011     Lab Results   Component Value Date    CO2 26 08/05/2011     Lab Results   Component Value Date    BUN 28 08/05/2011     Lab Results   Component Value Date    CR 0.96 08/05/2011     Lab Results   Component Value Date    GLC 87 08/05/2011       HGB 13.8  K 3.9    EKG  05-DEC-2017 11:01:50 [facility]-EYE ROUTINE RECORD  Sinus rhythm with frequent Premature ventricular complexes in a pattern of bigeminy  Left axis deviation  Non-specific intra-ventricular conduction block  Possible Anterolateral infarct , age undetermined  Abnormal ECG  When compared with ECG of 04-AUG-2011 13:44,  No significant change was found    Echo 12/2014  Indication for study: Bradycardia  Cardiac Rhythm: BBB/TRIGEMINY and with premature ventricular contractions.Study quality: Technically limited.    Final Impressions:   1. Technically limited exam.   2. Normal LV size, normal wall thickness, normal global systolic function with an estimated EF of 60 - 65%.   3. Right ventricular cavity size is normal, global systolic RV function is normal.   4. Grade 1 pattern of LV  diastolic filling.   5. Mildly enlarged left atrium.    Chamber Sizes and Function  Normal left ventricular size, normal wall thickness, normal global systolic function with an estimated EF of 60 - 65%. Left atrial size is mildly enlarged. Right ventricular cavity size is normal, global systolic RV function is normal. The right atrium is mildly enlarged. The pulmonary artery is of normal size and origin. The sinus of Valsalva is normal sized. The ascending aorta is normal sized.    Valves, RV Pressures and Diastolic Function    The aortic valve is tricuspid, no stenosis and trivial regurgitation. The mitral valve is sclerotic, trace mitral regurgitation. Mitral annular calcification is present. Spectral Doppler shows Grade 1 pattern of LV diastolic filling. The tricuspid valve is normal in structure. Tricuspid regurgitation is trace. The tricuspid regurgitant velocity is 2.5 m/s, the estimated right ventricular systolic pressure is 25 mmHg plus right atrial pressure. The pulmonic valve is not well visualized. Not evident pulmonic regurgitation is present on color flow.    Masses, Effusion, Shunts  There is no pericardial effusion. The inferior vena cava is not well visualized, respiratory size variation not well visualized. No left to right shunting was detected by limited color flow Doppler interrogation of the interatrial septum.    Anesthesia Evaluation     . Pt has had prior anesthetic.     No history of anesthetic complications (Hard time coming out of GETA)          ROS/MED HX    ENT/Pulmonary: Comment: Parotid mass     (-) tobacco use and asthma   Neurologic: Comment: Memory loss  insomnia    (+)dementia, migraines, TIA features: transient amnesia,     Cardiovascular: Comment: Hx of bigeminy     (+) Dyslipidemia, hypertension----. Taking blood thinners (plavix) : . . . :. .       METS/Exercise Tolerance:  >4 METS   Hematologic:         Musculoskeletal:         GI/Hepatic:        (-) GERD and liver disease    Renal/Genitourinary:      (-) renal disease   Endo:         Psychiatric:     (+) psychiatric history anxiety      Infectious Disease:         Malignancy:         Other:                                    Anesthesia Plan      History & Physical Review      ASA Status:  2 .    NPO Status:  > 8 hours    Plan for MAC Reason for MAC:  Deep or markedly invasive procedure (G8)  PONV prophylaxis:  Ondansetron (or other 5HT-3)       Postoperative Care      Consents  Anesthetic plan, risks, benefits and alternatives discussed with:  Patient..                          .

## 2018-01-02 NOTE — DISCHARGE INSTRUCTIONS
[facility] Anesthesia Eye Care Center Discharge  Instructions  Anesthesia (Eye Care Center)   Adult Discharge Instructions    For 24 hours after surgery    1. Get plenty of rest.  Make arrangements to have a responsible adult stay with you for at least 6 hours after you leave the hospital.  2. Do not drive or use heavy equipment for 24 hours.    3. Do not drink alcohol for 24 hours.  4. Do not sign legal documents or make important decisions for 24 hours.  5. Avoid strenuous or risky activities. You may feel lightheaded.  If so, sit for a few minutes before standing.  Have someone help you get up.   6. Conscious sedation patients may resume a regular diet..  7. Any questions of medical nature, call your physician.    [facility]  Cataract Surgery Discharge Instructions  [facility] MD SUSHIL Pina MD J. Hasan, MD C. Nichols, MD J. O'Neill, MD S. Schaefer, MD J. Stephens, MD        Start using drops when you arrive at home today      You may have been prescribed SmartDrops or OneDrop, which is a compound formula drop that combines all three medications in a single drop. This drop should be instilled to the surgical eye 3 times daily until gone.  Start Brimodidine Tartrate Ophthalmalic solution 3 times per day into the surgical eye until gone.      Place shield over surgical eye at bedtime for 3 nights.      The eye will feel itchy, scratchy, and vision will be blurred, you may take Tylenol for the scratchy feeling if this is bothersome.      No eye rubbing or swimming for I week.      You may resume all prescription medications as directed by your primary doctor.      Call if increasing pain, progressively worsening vision or worsening redness of surgical eye.      On-call doctor can be reached at [phone].

## 2018-01-02 NOTE — OR NURSING
Pt HR low in the 30's.  Pt asymptomatic.  Discovered at last procedure pt has bigeminy lead done at that time.  Dr. Thompson made aware.  Pt does increase into the 60's-70's when bigeminy is not active.  Will continue to monitor.

## 2018-01-02 NOTE — IP AVS SNAPSHOT
MRN:[id number]                      After Visit Summary   1/2/2018    Princess Corrales    MRN: [id number]           Thank you!     Thank you for choosing [facility] for your care. Our goal is always to provide you with excellent care. Hearing back from our patients is one way we can continue to improve our services. Please take a few minutes to complete the written survey that you may receive in the mail after you visit with us. Thank you!        Patient Information     Date Of Birth          1940        About your hospital stay     You were admitted on:  January 2, 2018 You last received care in the:  [facility]    You were discharged on:  January 2, 2018       Who to Call     For medical emergencies, please call 911.  For non-urgent questions about your medical care, please call your primary care provider or clinic, [phone]  For questions related to your surgery, please call your surgery clinic        Attending Provider     Provider Artemio Martell MD Ophthalmology       Primary Care Provider Office Phone # Fax #    Delmy Kern [phone]      Further instructions from your care team       [facility] Anesthesia Eye Care Center Discharge  Instructions  Anesthesia (Eye Care Center)   Adult Discharge Instructions    For 24 hours after surgery    1. Get plenty of rest.  Make arrangements to have a responsible adult stay with you for at least 6 hours after you leave the hospital.  2. Do not drive or use heavy equipment for 24 hours.    3. Do not drink alcohol for 24 hours.  4. Do not sign legal documents or make important decisions for 24 hours.  5. Avoid strenuous or risky activities. You may feel lightheaded.  If so, sit for a few minutes before standing.  Have someone help you get up.   6. Conscious sedation patients may resume a regular diet..  7. Any questions of medical nature, call your physician.    [facility]  "[facility]  Cataract Surgery Discharge Instructions  [facility] MD SUSHIL Pina MD J. Hasan, MD C. Nichols, MD J. O'Neill, MD S. Schaefer, MD J. Stephens, MD        Start using drops when you arrive at home today      You may have been prescribed SmartDrops or OneDrop, which is a compound formula drop that combines all three medications in a single drop. This drop should be instilled to the surgical eye 3 times daily until gone.  Start Brimodidine Tartrate Ophthalmalic solution 3 times per day into the surgical eye until gone.      Place shield over surgical eye at bedtime for 3 nights.      The eye will feel itchy, scratchy, and vision will be blurred, you may take Tylenol for the scratchy feeling if this is bothersome.      No eye rubbing or swimming for I week.      You may resume all prescription medications as directed by your primary doctor.      Call if increasing pain, progressively worsening vision or worsening redness of surgical eye.      On-call doctor can be reached at [phone].    Pending Results     No orders found from 12/31/2017 to 1/3/2018.            Admission Information     Date & Time Provider Department Dept. Phone    1/2/2018 Artemio Millan MD [facility] [phone]      Your Vitals Were     Blood Pressure Temperature Respirations Height Weight Pulse Oximetry    121/66 97.5  F (36.4  C) (Temporal) 12 1.591 m (5' 2.63\") 66.7 kg (147 lb 0.8 oz) 94%    BMI (Body Mass Index)                   26.36 kg/m2           [employer] Information     [employer] lets you send messages to your doctor, view your test results, renew your prescriptions, schedule appointments and more. To sign up, go to [email or web address] . Click on \"Log in\" on the left side of the screen, which will take you to the Welcome page. Then click on \"Sign up Now\" on the right side of the page.     You will be asked to " enter the access code listed below, as well as some personal information. Please follow the directions to create your username and password.     Your access code is: [email or web address]  Expires: 3/5/2018 10:15 AM     Your access code will  in 90 days. If you need help or a new code, please call your [facility] clinic or [phone].        Care EveryWhere ID     This is your Care EveryWhere ID. This could be used by other organizations to access your [facility] medical records  [id number]        Equal Access to Services     [facility]: Hadii jaxson hamm hadvernon Soryder, waaxda luqadaha, qaybta kaalmayamileth grissom, adriano louise . So [facility] [phone].    ATENCIÓN: Si habla español, tiene a seo disposición servicios gratuitos de asistencia lingüística. Llame al [phone].    We comply with applicable federal civil rights laws and [place] laws. We do not discriminate on the basis of race, color, national origin, age, disability, sex, sexual orientation, or gender identity.               Review of your medicines      UNREVIEWED medicines. Ask your doctor about these medicines        Dose / Directions    ACETAMINOPHEN PO        Dose:  325 mg   Take 325 mg by mouth every 4 hours as needed for pain   Refills:  0       ATORVASTATIN CALCIUM PO        Dose:  80 mg   Take 80 mg by mouth daily   Refills:  0       Calcium Carb-Cholecalciferol 600-200 MG-UNIT Tabs        Refills:  0       CITALOPRAM HYDROBROMIDE PO        Dose:  10 mg   Take 10 mg by mouth daily   Refills:  0       CLOPIDOGREL BISULFATE PO        Dose:  75 mg   Take 75 mg by mouth daily   Refills:  0       DONEPEZIL HCL PO        Dose:  5 mg   Take 5 mg by mouth At Bedtime   Refills:  0       lisinopril-hydrochlorothiazide 20-12.5 MG per tablet   Commonly known as:  PRINZIDE/ZESTORETIC        Dose:  1 tablet   Take 1 tablet by mouth daily   Refills:  0       Multi-vitamin Tabs tablet        Dose:  1 tablet   Take 1 tablet by mouth  daily   Refills:  0                Protect others around you: Learn how to safely use, store and throw away your medicines at www.disposemymeds.org.             Medication List: This is a list of all your medications and when to take them. Check marks below indicate your daily home schedule. Keep this list as a reference.      Medications           Morning Afternoon Evening Bedtime As Needed    ACETAMINOPHEN PO   Take 325 mg by mouth every 4 hours as needed for pain                                ATORVASTATIN CALCIUM PO   Take 80 mg by mouth daily                                Calcium Carb-Cholecalciferol 600-200 MG-UNIT Tabs                                CITALOPRAM HYDROBROMIDE PO   Take 10 mg by mouth daily                                CLOPIDOGREL BISULFATE PO   Take 75 mg by mouth daily                                DONEPEZIL HCL PO   Take 5 mg by mouth At Bedtime                                lisinopril-hydrochlorothiazide 20-12.5 MG per tablet   Commonly known as:  PRINZIDE/ZESTORETIC   Take 1 tablet by mouth daily                                Multi-vitamin Tabs tablet   Take 1 tablet by mouth daily

## 2018-01-02 NOTE — ANESTHESIA CARE TRANSFER NOTE
Patient: Princess Corrales    Procedure(s):  LEFT EYE PHACOEMULSIFICATION CLEAR CORNEA WITH DELUXE MULTIFOCAL INTRAOCULAR LENS IMPLANT, ANTERIOR VITRECTOMY  - Wound Class: I-Clean   - Wound Class: I-Clean    Diagnosis: LEFT EYE CATARACT  Diagnosis Additional Information: No value filed.    Anesthesia Type:   No value filed.     Note:  Airway :Room Air  Patient transferred to:Phase II  Comments: Pt awake and able to verbalize needs. Spontaneous respiration without difficulty. All monitors on and audible. Report given to PACU RN, Vital Signs Stable. Pt denies pain.Handoff Report: Identifed the Patient, Identified the Reponsible Provider, Reviewed the pertinent medical history, Discussed the surgical course, Reviewed Intra-OP anesthesia mangement and issues during anesthesia, Set expectations for post-procedure period and Allowed opportunity for questions and acknowledgement of understanding      Vitals: (Last set prior to Anesthesia Care Transfer)    CRNA VITALS  1/2/2018 0835 - 1/2/2018 0907      1/2/2018             Resp Rate (set): 10                Electronically Signed By: ROCHELLE Ruiz CRNA  January 2, 2018  9:07 AM

## 2018-01-02 NOTE — IP AVS SNAPSHOT
[facility]    [address]    [place] [address]    Phone:  [phone]    Fax:  [phone]                                       After Visit Summary   1/2/2018    Princess Corrales    MRN: [id number]           After Visit Summary Signature Page     I have received my discharge instructions, and my questions have been answered. I have discussed any challenges I see with this plan with the nurse or doctor.    ..........................................................................................................................................  Patient/Patient Representative Signature      ..........................................................................................................................................  Patient Representative Print Name and Relationship to Patient    ..................................................               ................................................  Date                                            Time    ..........................................................................................................................................  Reviewed by Signature/Title    ...................................................              ..............................................  Date                                                            Time

## 2023-07-18 ENCOUNTER — LAB REQUISITION (OUTPATIENT)
Dept: LAB | Facility: CLINIC | Age: 83
End: 2023-07-18
Payer: MEDICARE

## 2023-07-18 DIAGNOSIS — F03.90 UNSPECIFIED DEMENTIA, UNSPECIFIED SEVERITY, WITHOUT BEHAVIORAL DISTURBANCE, PSYCHOTIC DISTURBANCE, MOOD DISTURBANCE, AND ANXIETY (H): ICD-10-CM

## 2023-07-18 DIAGNOSIS — E55.9 VITAMIN D DEFICIENCY, UNSPECIFIED: ICD-10-CM

## 2023-07-18 DIAGNOSIS — E78.2 MIXED HYPERLIPIDEMIA: ICD-10-CM

## 2023-07-18 DIAGNOSIS — I10 ESSENTIAL (PRIMARY) HYPERTENSION: ICD-10-CM

## 2023-07-24 LAB
ALBUMIN SERPL BCG-MCNC: 4 G/DL (ref 3.5–5.2)
ALP SERPL-CCNC: 72 U/L (ref 35–104)
ALT SERPL W P-5'-P-CCNC: <5 U/L (ref 0–50)
ANION GAP SERPL CALCULATED.3IONS-SCNC: 13 MMOL/L (ref 7–15)
AST SERPL W P-5'-P-CCNC: 20 U/L (ref 0–45)
BILIRUB SERPL-MCNC: 0.3 MG/DL
BUN SERPL-MCNC: 17.8 MG/DL (ref 8–23)
CALCIUM SERPL-MCNC: 9.6 MG/DL (ref 8.8–10.2)
CHLORIDE SERPL-SCNC: 107 MMOL/L (ref 98–107)
CHOLEST SERPL-MCNC: 130 MG/DL
CREAT SERPL-MCNC: 0.8 MG/DL (ref 0.51–0.95)
DEPRECATED HCO3 PLAS-SCNC: 23 MMOL/L (ref 22–29)
ERYTHROCYTE [DISTWIDTH] IN BLOOD BY AUTOMATED COUNT: 14.9 % (ref 10–15)
GFR SERPL CREATININE-BSD FRML MDRD: 72 ML/MIN/1.73M2
GLUCOSE SERPL-MCNC: 165 MG/DL (ref 70–99)
HCT VFR BLD AUTO: 44.6 % (ref 35–47)
HDLC SERPL-MCNC: 44 MG/DL
HGB BLD-MCNC: 14.2 G/DL (ref 11.7–15.7)
LDLC SERPL CALC-MCNC: 69 MG/DL
MCH RBC QN AUTO: 29.4 PG (ref 26.5–33)
MCHC RBC AUTO-ENTMCNC: 31.8 G/DL (ref 31.5–36.5)
MCV RBC AUTO: 92 FL (ref 78–100)
NONHDLC SERPL-MCNC: 86 MG/DL
PLATELET # BLD AUTO: 263 10E3/UL (ref 150–450)
POTASSIUM SERPL-SCNC: 3.4 MMOL/L (ref 3.4–5.3)
PROT SERPL-MCNC: 6.2 G/DL (ref 6.4–8.3)
RBC # BLD AUTO: 4.83 10E6/UL (ref 3.8–5.2)
SODIUM SERPL-SCNC: 143 MMOL/L (ref 136–145)
TRIGL SERPL-MCNC: 84 MG/DL
TSH SERPL DL<=0.005 MIU/L-ACNC: 1.95 UIU/ML (ref 0.3–4.2)
VIT B12 SERPL-MCNC: 588 PG/ML (ref 232–1245)
WBC # BLD AUTO: 5.3 10E3/UL (ref 4–11)

## 2023-07-24 PROCEDURE — P9603 ONE-WAY ALLOW PRORATED MILES: HCPCS | Mod: ORL

## 2023-07-24 PROCEDURE — 82607 VITAMIN B-12: CPT | Mod: ORL

## 2023-07-24 PROCEDURE — 82306 VITAMIN D 25 HYDROXY: CPT | Mod: ORL

## 2023-07-24 PROCEDURE — 84443 ASSAY THYROID STIM HORMONE: CPT | Mod: ORL

## 2023-07-24 PROCEDURE — 85027 COMPLETE CBC AUTOMATED: CPT | Mod: ORL

## 2023-07-24 PROCEDURE — 36415 COLL VENOUS BLD VENIPUNCTURE: CPT | Mod: ORL

## 2023-07-24 PROCEDURE — 80053 COMPREHEN METABOLIC PANEL: CPT | Mod: ORL

## 2023-07-24 PROCEDURE — 80061 LIPID PANEL: CPT | Mod: ORL

## 2023-07-25 LAB — DEPRECATED CALCIDIOL+CALCIFEROL SERPL-MC: 25 UG/L (ref 20–75)

## 2024-01-14 ENCOUNTER — LAB REQUISITION (OUTPATIENT)
Dept: LAB | Facility: CLINIC | Age: 84
End: 2024-01-14
Payer: MEDICARE

## 2024-01-14 DIAGNOSIS — I10 ESSENTIAL (PRIMARY) HYPERTENSION: ICD-10-CM

## 2024-01-15 LAB
BASOPHILS # BLD AUTO: 0.1 10E3/UL (ref 0–0.2)
BASOPHILS NFR BLD AUTO: 1 %
EOSINOPHIL # BLD AUTO: 0.2 10E3/UL (ref 0–0.7)
EOSINOPHIL NFR BLD AUTO: 2 %
ERYTHROCYTE [DISTWIDTH] IN BLOOD BY AUTOMATED COUNT: 14.6 % (ref 10–15)
HCT VFR BLD AUTO: 46 % (ref 35–47)
HGB BLD-MCNC: 15.1 G/DL (ref 11.7–15.7)
IMM GRANULOCYTES # BLD: 0 10E3/UL
IMM GRANULOCYTES NFR BLD: 0 %
LYMPHOCYTES # BLD AUTO: 1.5 10E3/UL (ref 0.8–5.3)
LYMPHOCYTES NFR BLD AUTO: 12 %
MCH RBC QN AUTO: 30.4 PG (ref 26.5–33)
MCHC RBC AUTO-ENTMCNC: 32.8 G/DL (ref 31.5–36.5)
MCV RBC AUTO: 93 FL (ref 78–100)
MONOCYTES # BLD AUTO: 0.8 10E3/UL (ref 0–1.3)
MONOCYTES NFR BLD AUTO: 7 %
NEUTROPHILS # BLD AUTO: 9.8 10E3/UL (ref 1.6–8.3)
NEUTROPHILS NFR BLD AUTO: 78 %
NRBC # BLD AUTO: 0 10E3/UL
NRBC BLD AUTO-RTO: 0 /100
PLATELET # BLD AUTO: 272 10E3/UL (ref 150–450)
RBC # BLD AUTO: 4.96 10E6/UL (ref 3.8–5.2)
RETICS # AUTO: 0.07 10E6/UL (ref 0.03–0.1)
RETICS/RBC NFR AUTO: 1.5 % (ref 0.5–2)
WBC # BLD AUTO: 12.4 10E3/UL (ref 4–11)

## 2024-01-15 PROCEDURE — P9603 ONE-WAY ALLOW PRORATED MILES: HCPCS | Mod: ORL | Performed by: NURSE PRACTITIONER

## 2024-01-15 PROCEDURE — 85045 AUTOMATED RETICULOCYTE COUNT: CPT | Mod: ORL | Performed by: NURSE PRACTITIONER

## 2024-01-15 PROCEDURE — 85025 COMPLETE CBC W/AUTO DIFF WBC: CPT | Mod: ORL | Performed by: NURSE PRACTITIONER

## 2024-01-15 PROCEDURE — 36415 COLL VENOUS BLD VENIPUNCTURE: CPT | Mod: ORL | Performed by: NURSE PRACTITIONER

## 2024-01-16 LAB
PATH REPORT.COMMENTS IMP SPEC: NORMAL
PATH REPORT.COMMENTS IMP SPEC: NORMAL
PATH REPORT.FINAL DX SPEC: NORMAL
PATH REPORT.MICROSCOPIC SPEC OTHER STN: NORMAL
PATH REPORT.MICROSCOPIC SPEC OTHER STN: NORMAL
PATH REPORT.RELEVANT HX SPEC: NORMAL

## 2024-01-16 PROCEDURE — 99207 BLOOD MORPHOLOGY PATHOLOGIST REVIEW: CPT | Performed by: PATHOLOGY

## 2024-02-17 ENCOUNTER — LAB REQUISITION (OUTPATIENT)
Dept: LAB | Facility: CLINIC | Age: 84
End: 2024-02-17
Payer: MEDICARE

## 2024-02-17 DIAGNOSIS — I67.9 CEREBROVASCULAR DISEASE, UNSPECIFIED: ICD-10-CM

## 2024-02-19 LAB
ANION GAP SERPL CALCULATED.3IONS-SCNC: 12 MMOL/L (ref 7–15)
BUN SERPL-MCNC: 15.6 MG/DL (ref 8–23)
CALCIUM SERPL-MCNC: 9.3 MG/DL (ref 8.8–10.2)
CHLORIDE SERPL-SCNC: 106 MMOL/L (ref 98–107)
CREAT SERPL-MCNC: 0.75 MG/DL (ref 0.51–0.95)
DEPRECATED HCO3 PLAS-SCNC: 23 MMOL/L (ref 22–29)
EGFRCR SERPLBLD CKD-EPI 2021: 78 ML/MIN/1.73M2
GLUCOSE SERPL-MCNC: 87 MG/DL (ref 70–99)
POTASSIUM SERPL-SCNC: 3.7 MMOL/L (ref 3.4–5.3)
SODIUM SERPL-SCNC: 141 MMOL/L (ref 135–145)

## 2024-02-19 PROCEDURE — P9604 ONE-WAY ALLOW PRORATED TRIP: HCPCS | Mod: ORL | Performed by: NURSE PRACTITIONER

## 2024-02-19 PROCEDURE — 80048 BASIC METABOLIC PNL TOTAL CA: CPT | Mod: ORL | Performed by: NURSE PRACTITIONER

## 2024-02-19 PROCEDURE — 36415 COLL VENOUS BLD VENIPUNCTURE: CPT | Mod: ORL | Performed by: NURSE PRACTITIONER

## 2025-01-06 ENCOUNTER — LAB REQUISITION (OUTPATIENT)
Dept: LAB | Facility: CLINIC | Age: 85
End: 2025-01-06
Payer: MEDICARE

## 2025-01-06 DIAGNOSIS — Z00.00 ENCOUNTER FOR GENERAL ADULT MEDICAL EXAMINATION WITHOUT ABNORMAL FINDINGS: ICD-10-CM

## 2025-01-06 DIAGNOSIS — I63.9 CEREBRAL INFARCTION, UNSPECIFIED (H): ICD-10-CM

## 2025-01-06 DIAGNOSIS — F39 UNSPECIFIED MOOD (AFFECTIVE) DISORDER: ICD-10-CM

## 2025-01-06 DIAGNOSIS — Z71.89 OTHER SPECIFIED COUNSELING: ICD-10-CM

## 2025-01-06 DIAGNOSIS — R26.89 OTHER ABNORMALITIES OF GAIT AND MOBILITY: ICD-10-CM

## 2025-01-06 DIAGNOSIS — G47.00 INSOMNIA, UNSPECIFIED: ICD-10-CM

## 2025-01-06 DIAGNOSIS — F03.90 UNSPECIFIED DEMENTIA, UNSPECIFIED SEVERITY, WITHOUT BEHAVIORAL DISTURBANCE, PSYCHOTIC DISTURBANCE, MOOD DISTURBANCE, AND ANXIETY (H): ICD-10-CM

## 2025-01-06 DIAGNOSIS — Z66 DO NOT RESUSCITATE: ICD-10-CM

## 2025-01-06 DIAGNOSIS — R54 AGE-RELATED PHYSICAL DEBILITY: ICD-10-CM

## 2025-01-06 DIAGNOSIS — E46 UNSPECIFIED PROTEIN-CALORIE MALNUTRITION: ICD-10-CM

## 2025-01-06 DIAGNOSIS — D69.2 OTHER NONTHROMBOCYTOPENIC PURPURA: ICD-10-CM

## 2025-01-14 ENCOUNTER — LAB REQUISITION (OUTPATIENT)
Dept: LAB | Facility: CLINIC | Age: 85
End: 2025-01-14

## 2025-01-14 DIAGNOSIS — R41.9 UNSPECIFIED SYMPTOMS AND SIGNS INVOLVING COGNITIVE FUNCTIONS AND AWARENESS: ICD-10-CM

## 2025-01-14 LAB
ALBUMIN UR-MCNC: 20 MG/DL
APPEARANCE UR: ABNORMAL
BACTERIA #/AREA URNS HPF: ABNORMAL /HPF
BILIRUB UR QL STRIP: NEGATIVE
COLOR UR AUTO: YELLOW
GLUCOSE UR STRIP-MCNC: NEGATIVE MG/DL
HGB UR QL STRIP: NEGATIVE
KETONES UR STRIP-MCNC: NEGATIVE MG/DL
LEUKOCYTE ESTERASE UR QL STRIP: ABNORMAL
MUCOUS THREADS #/AREA URNS LPF: PRESENT /LPF
NITRATE UR QL: POSITIVE
PH UR STRIP: 6 [PH] (ref 5–7)
RBC URINE: 0 /HPF
SP GR UR STRIP: 1.02 (ref 1–1.03)
SQUAMOUS EPITHELIAL: 2 /HPF
TRANSITIONAL EPI: <1 /HPF
UROBILINOGEN UR STRIP-MCNC: NORMAL MG/DL
WBC URINE: 8 /HPF

## 2025-01-15 LAB — BACTERIA UR CULT: ABNORMAL

## 2025-01-20 LAB
ALBUMIN SERPL BCG-MCNC: 3.9 G/DL (ref 3.5–5.2)
ALP SERPL-CCNC: 60 U/L (ref 40–150)
ALT SERPL W P-5'-P-CCNC: 9 U/L (ref 0–50)
ANION GAP SERPL CALCULATED.3IONS-SCNC: 10 MMOL/L (ref 7–15)
AST SERPL W P-5'-P-CCNC: 24 U/L (ref 0–45)
BILIRUB SERPL-MCNC: 0.4 MG/DL
BUN SERPL-MCNC: 18.1 MG/DL (ref 8–23)
CALCIUM SERPL-MCNC: 9.9 MG/DL (ref 8.8–10.4)
CHLORIDE SERPL-SCNC: 104 MMOL/L (ref 98–107)
CREAT SERPL-MCNC: 0.91 MG/DL (ref 0.51–0.95)
EGFRCR SERPLBLD CKD-EPI 2021: 61 ML/MIN/1.73M2
GLUCOSE SERPL-MCNC: 86 MG/DL (ref 70–99)
HCO3 SERPL-SCNC: 27 MMOL/L (ref 22–29)
POTASSIUM SERPL-SCNC: 3.5 MMOL/L (ref 3.4–5.3)
PROT SERPL-MCNC: 6.2 G/DL (ref 6.4–8.3)
SODIUM SERPL-SCNC: 141 MMOL/L (ref 135–145)

## 2025-01-20 PROCEDURE — P9604 ONE-WAY ALLOW PRORATED TRIP: HCPCS | Mod: ORL | Performed by: NURSE PRACTITIONER

## 2025-01-20 PROCEDURE — 80053 COMPREHEN METABOLIC PANEL: CPT | Mod: ORL | Performed by: NURSE PRACTITIONER

## 2025-01-20 PROCEDURE — 36415 COLL VENOUS BLD VENIPUNCTURE: CPT | Mod: ORL | Performed by: NURSE PRACTITIONER

## 2025-04-30 ENCOUNTER — LAB REQUISITION (OUTPATIENT)
Dept: LAB | Facility: CLINIC | Age: 85
End: 2025-04-30
Payer: MEDICARE

## 2025-04-30 DIAGNOSIS — R53.1 WEAKNESS: ICD-10-CM

## 2025-05-05 LAB
ALBUMIN SERPL BCG-MCNC: 3.5 G/DL (ref 3.5–5.2)
ALP SERPL-CCNC: 73 U/L (ref 40–150)
ALT SERPL W P-5'-P-CCNC: 7 U/L (ref 0–50)
ANION GAP SERPL CALCULATED.3IONS-SCNC: 10 MMOL/L (ref 7–15)
AST SERPL W P-5'-P-CCNC: 20 U/L (ref 0–45)
BILIRUB SERPL-MCNC: 0.4 MG/DL
BUN SERPL-MCNC: 13.6 MG/DL (ref 8–23)
CALCIUM SERPL-MCNC: 9 MG/DL (ref 8.8–10.4)
CHLORIDE SERPL-SCNC: 102 MMOL/L (ref 98–107)
CREAT SERPL-MCNC: 0.75 MG/DL (ref 0.51–0.95)
EGFRCR SERPLBLD CKD-EPI 2021: 77 ML/MIN/1.73M2
ERYTHROCYTE [DISTWIDTH] IN BLOOD BY AUTOMATED COUNT: 13.5 % (ref 10–15)
GLUCOSE SERPL-MCNC: 103 MG/DL (ref 70–99)
HCO3 SERPL-SCNC: 28 MMOL/L (ref 22–29)
HCT VFR BLD AUTO: 40.5 % (ref 35–47)
HGB BLD-MCNC: 13.1 G/DL (ref 11.7–15.7)
MCH RBC QN AUTO: 30.5 PG (ref 26.5–33)
MCHC RBC AUTO-ENTMCNC: 32.3 G/DL (ref 31.5–36.5)
MCV RBC AUTO: 94 FL (ref 78–100)
PLATELET # BLD AUTO: 282 10E3/UL (ref 150–450)
POTASSIUM SERPL-SCNC: 3.3 MMOL/L (ref 3.4–5.3)
PROT SERPL-MCNC: 5.5 G/DL (ref 6.4–8.3)
RBC # BLD AUTO: 4.29 10E6/UL (ref 3.8–5.2)
SODIUM SERPL-SCNC: 140 MMOL/L (ref 135–145)
WBC # BLD AUTO: 7 10E3/UL (ref 4–11)

## 2025-05-05 PROCEDURE — P9604 ONE-WAY ALLOW PRORATED TRIP: HCPCS | Mod: ORL | Performed by: NURSE PRACTITIONER

## 2025-05-05 PROCEDURE — 85027 COMPLETE CBC AUTOMATED: CPT | Mod: ORL | Performed by: NURSE PRACTITIONER

## 2025-05-05 PROCEDURE — 80053 COMPREHEN METABOLIC PANEL: CPT | Mod: ORL | Performed by: NURSE PRACTITIONER

## 2025-05-05 PROCEDURE — 36415 COLL VENOUS BLD VENIPUNCTURE: CPT | Mod: ORL | Performed by: NURSE PRACTITIONER

## (undated) DEVICE — GLOVE PROTEXIS MICRO 7.0  2D73PM70

## (undated) DEVICE — EYE SOL BSS 500ML

## (undated) DEVICE — GLOVE PROTEXIS MICRO 7.5  2D73PM75

## (undated) DEVICE — EYE KNIFE SLIT XSTAR VISITEC 2.5MM 45DEG BEVEL UP 373725

## (undated) DEVICE — PACK CATARACT CUSTOM SO DALE SEY32CTFCX

## (undated) DEVICE — EYE SHIELD PLASTIC

## (undated) DEVICE — EYE PACK BVI READYPAK KIT #2

## (undated) DEVICE — LINEN TOWEL PACK X5 5464

## (undated) DEVICE — EYE HANDPIECE 23GA VITRECTOMY CENTURION 8065752134

## (undated) DEVICE — EYE PACK CUSTOM ANTERIOR 30DEG TIP CENTURION PPK6682-04

## (undated) DEVICE — EYE KNIFE CRESCENT 55DEG 373807

## (undated) DEVICE — APPLICATOR COTTON TIP 6"X2 STERILE LF 6012

## (undated) DEVICE — GLOVE PROTEXIS MICRO 6.0  2D73PM60

## (undated) DEVICE — TAPE MICROPORE 2"X1.5YD 1530S-2

## (undated) DEVICE — EYE KNIFE SLIT XSTAR VISITEC 2.4MM 45DEG BEVEL UP 373724

## (undated) DEVICE — GOWN LG DISP 9515

## (undated) DEVICE — GLOVE PROTEXIS MICRO 6.5  2D73PM65

## (undated) RX ORDER — ONDANSETRON 2 MG/ML
INJECTION INTRAMUSCULAR; INTRAVENOUS
Status: DISPENSED
Start: 2018-01-02

## (undated) RX ORDER — ONDANSETRON 2 MG/ML
INJECTION INTRAMUSCULAR; INTRAVENOUS
Status: DISPENSED
Start: 2017-12-05

## (undated) RX ORDER — LIDOCAINE HYDROCHLORIDE 10 MG/ML
INJECTION, SOLUTION EPIDURAL; INFILTRATION; INTRACAUDAL; PERINEURAL
Status: DISPENSED
Start: 2018-01-02